# Patient Record
Sex: FEMALE | Race: WHITE | NOT HISPANIC OR LATINO | Employment: PART TIME | URBAN - METROPOLITAN AREA
[De-identification: names, ages, dates, MRNs, and addresses within clinical notes are randomized per-mention and may not be internally consistent; named-entity substitution may affect disease eponyms.]

---

## 2023-09-08 PROBLEM — R03.0 BLOOD PRESSURE ELEVATED WITHOUT HISTORY OF HTN: Status: ACTIVE | Noted: 2023-09-08

## 2023-09-08 PROBLEM — G43.909 MIGRAINES: Status: ACTIVE | Noted: 2023-09-08

## 2023-09-08 PROBLEM — E06.3 HASHIMOTO'S DISEASE: Status: ACTIVE | Noted: 2023-09-08

## 2023-09-08 PROBLEM — E55.9 VITAMIN D DEFICIENCY: Status: ACTIVE | Noted: 2023-09-08

## 2023-09-08 RX ORDER — ALBUTEROL SULFATE 90 UG/1
2 AEROSOL, METERED RESPIRATORY (INHALATION) EVERY 4 HOURS PRN
COMMUNITY
Start: 2021-03-31 | End: 2024-04-26 | Stop reason: SDUPTHER

## 2023-09-08 RX ORDER — MULTIVIT/FOLIC ACID/HERBAL 223 400 MCG
TABLET ORAL
COMMUNITY

## 2023-09-08 RX ORDER — MULTIVITAMIN
1 TABLET ORAL DAILY
COMMUNITY

## 2023-09-08 RX ORDER — LEVOTHYROXINE SODIUM 50 UG/1
0.5 TABLET ORAL DAILY
COMMUNITY
Start: 2020-06-26 | End: 2023-10-19 | Stop reason: SDUPTHER

## 2023-09-11 ENCOUNTER — OFFICE VISIT (OUTPATIENT)
Dept: PRIMARY CARE | Facility: CLINIC | Age: 48
End: 2023-09-11
Payer: COMMERCIAL

## 2023-09-11 VITALS
HEIGHT: 64 IN | BODY MASS INDEX: 25.95 KG/M2 | DIASTOLIC BLOOD PRESSURE: 82 MMHG | SYSTOLIC BLOOD PRESSURE: 124 MMHG | WEIGHT: 152 LBS

## 2023-09-11 DIAGNOSIS — Z00.00 HEALTH CARE MAINTENANCE: ICD-10-CM

## 2023-09-11 DIAGNOSIS — Z12.31 BREAST CANCER SCREENING BY MAMMOGRAM: ICD-10-CM

## 2023-09-11 DIAGNOSIS — Z00.00 HEALTHCARE MAINTENANCE: Primary | ICD-10-CM

## 2023-09-11 DIAGNOSIS — E06.3 HYPOTHYROIDISM DUE TO HASHIMOTO'S THYROIDITIS: ICD-10-CM

## 2023-09-11 DIAGNOSIS — E03.8 HYPOTHYROIDISM DUE TO HASHIMOTO'S THYROIDITIS: ICD-10-CM

## 2023-09-11 PROCEDURE — 1036F TOBACCO NON-USER: CPT | Performed by: STUDENT IN AN ORGANIZED HEALTH CARE EDUCATION/TRAINING PROGRAM

## 2023-09-11 PROCEDURE — 99203 OFFICE O/P NEW LOW 30 MIN: CPT | Performed by: STUDENT IN AN ORGANIZED HEALTH CARE EDUCATION/TRAINING PROGRAM

## 2023-09-11 RX ORDER — DROSPIRENONE 4 MG/1
1 TABLET, FILM COATED ORAL NIGHTLY
COMMUNITY
End: 2023-12-12 | Stop reason: SDUPTHER

## 2023-09-11 NOTE — PROGRESS NOTES
Subjective   Patient ID: Kaila Jo is a 48 y.o. female who presents for Establish Care.  SAVITA Amaya is a 49 y/o female who is here to establish care.    She eats a balanced diet. She walks the dog for ~30 minutes 3x/week. She does not have a voiced complaint at this time and is interested in updating her mammogram. She has not had any significant palpitations recently, gets them occasionally after she eats, associated with hot flashes/post-menopausal syndrome.    PMHx: Hypothyroidism  SurgHx: Lipoma removal, wrist fracture repair, tonsillectomy  FamHx: HTN - Father, Meniere's disease, COPD - Mother  SocialHx: Works as a . Never smoker, no drug use. 1-2x/week. Lives with , two daughters (16), (13).     Review of Systems  12-point ROS was reviewed and is negative, unless otherwise noted in HPI    Objective   Vitals:    09/11/23 1008   BP: 124/82      Physical Exam  GEN: alert, conversant, NAD  HEENT: PERRL, EOMI, MMM, tms pearly gray bilaterally   NECK: supple, no LAD appreciated  CHEST: CTAB  CV: S1, S2, RRR, no murmurs appreciated  ABD: soft, NT, ND  EXT: no significant LE edema  SKIN: warm, dry    Assessment/Plan   #Hypothyroidism d/t hashimotos   - Continue Levothyroxine  - Recent TSH level reviewed, TSH ordered - patient to get labwork in December    #intermittent palpitations  #post-menopausal syndrome  - improved with [place] Cohash  - reviewed labwork from January     Health Maintenance:  Vaccines: COVID - UTD, Flu (will get in October), TDAP (2016)  Screening: Colonoscopy (due in 2027-29), Mammogram (ordered), Pap test (11/22)  Labs: Ordered CBC, CMP, lipid panel, TSH       RTC in 12 months, or sooner PRN    Steven Hutton MD

## 2023-10-19 ENCOUNTER — LAB (OUTPATIENT)
Dept: LAB | Facility: LAB | Age: 48
End: 2023-10-19
Payer: COMMERCIAL

## 2023-10-19 DIAGNOSIS — Z00.00 HEALTHCARE MAINTENANCE: ICD-10-CM

## 2023-10-19 DIAGNOSIS — E03.8 HYPOTHYROIDISM DUE TO HASHIMOTO'S THYROIDITIS: ICD-10-CM

## 2023-10-19 DIAGNOSIS — E06.3 HYPOTHYROIDISM DUE TO HASHIMOTO'S THYROIDITIS: ICD-10-CM

## 2023-10-19 DIAGNOSIS — Z00.00 HEALTH CARE MAINTENANCE: ICD-10-CM

## 2023-10-19 DIAGNOSIS — E03.8 HYPOTHYROIDISM DUE TO HASHIMOTO'S THYROIDITIS: Primary | ICD-10-CM

## 2023-10-19 DIAGNOSIS — E06.3 HYPOTHYROIDISM DUE TO HASHIMOTO'S THYROIDITIS: Primary | ICD-10-CM

## 2023-10-19 LAB
ALBUMIN SERPL BCP-MCNC: 4.6 G/DL (ref 3.4–5)
ALP SERPL-CCNC: 77 U/L (ref 33–110)
ALT SERPL W P-5'-P-CCNC: 25 U/L (ref 7–45)
ANION GAP SERPL CALC-SCNC: 17 MMOL/L (ref 10–20)
AST SERPL W P-5'-P-CCNC: 18 U/L (ref 9–39)
BILIRUB SERPL-MCNC: 0.7 MG/DL (ref 0–1.2)
BUN SERPL-MCNC: 10 MG/DL (ref 6–23)
CALCIUM SERPL-MCNC: 9.3 MG/DL (ref 8.6–10.6)
CHLORIDE SERPL-SCNC: 105 MMOL/L (ref 98–107)
CHOLEST SERPL-MCNC: 203 MG/DL (ref 0–199)
CHOLESTEROL/HDL RATIO: 3.6
CO2 SERPL-SCNC: 22 MMOL/L (ref 21–32)
CREAT SERPL-MCNC: 0.77 MG/DL (ref 0.5–1.05)
ERYTHROCYTE [DISTWIDTH] IN BLOOD BY AUTOMATED COUNT: 12.4 % (ref 11.5–14.5)
GFR SERPL CREATININE-BSD FRML MDRD: >90 ML/MIN/1.73M*2
GLUCOSE SERPL-MCNC: 94 MG/DL (ref 74–99)
HCT VFR BLD AUTO: 40.9 % (ref 36–46)
HDLC SERPL-MCNC: 56.7 MG/DL
HGB BLD-MCNC: 13.2 G/DL (ref 12–16)
LDLC SERPL CALC-MCNC: 121 MG/DL
MCH RBC QN AUTO: 29.1 PG (ref 26–34)
MCHC RBC AUTO-ENTMCNC: 32.3 G/DL (ref 32–36)
MCV RBC AUTO: 90 FL (ref 80–100)
NON HDL CHOLESTEROL: 146 MG/DL (ref 0–149)
NRBC BLD-RTO: 0 /100 WBCS (ref 0–0)
PLATELET # BLD AUTO: 291 X10*3/UL (ref 150–450)
PMV BLD AUTO: 10.9 FL (ref 7.5–11.5)
POTASSIUM SERPL-SCNC: 4.1 MMOL/L (ref 3.5–5.3)
PROT SERPL-MCNC: 7.1 G/DL (ref 6.4–8.2)
RBC # BLD AUTO: 4.54 X10*6/UL (ref 4–5.2)
SODIUM SERPL-SCNC: 140 MMOL/L (ref 136–145)
TRIGL SERPL-MCNC: 125 MG/DL (ref 0–149)
TSH SERPL-ACNC: 3.36 MIU/L (ref 0.44–3.98)
VLDL: 25 MG/DL (ref 0–40)
WBC # BLD AUTO: 7.5 X10*3/UL (ref 4.4–11.3)

## 2023-10-19 PROCEDURE — 80053 COMPREHEN METABOLIC PANEL: CPT

## 2023-10-19 PROCEDURE — 36415 COLL VENOUS BLD VENIPUNCTURE: CPT

## 2023-10-19 PROCEDURE — 80061 LIPID PANEL: CPT

## 2023-10-19 PROCEDURE — 84443 ASSAY THYROID STIM HORMONE: CPT

## 2023-10-19 PROCEDURE — 85027 COMPLETE CBC AUTOMATED: CPT

## 2023-10-19 RX ORDER — LEVOTHYROXINE SODIUM 25 UG/1
25 TABLET ORAL DAILY
Qty: 90 TABLET | Refills: 1 | Status: SHIPPED | OUTPATIENT
Start: 2023-10-19 | End: 2024-04-17

## 2023-11-20 ENCOUNTER — APPOINTMENT (OUTPATIENT)
Dept: RADIOLOGY | Facility: CLINIC | Age: 48
End: 2023-11-20
Payer: COMMERCIAL

## 2023-12-08 ENCOUNTER — ANCILLARY PROCEDURE (OUTPATIENT)
Dept: RADIOLOGY | Facility: CLINIC | Age: 48
End: 2023-12-08
Payer: COMMERCIAL

## 2023-12-08 DIAGNOSIS — Z12.31 BREAST CANCER SCREENING BY MAMMOGRAM: ICD-10-CM

## 2023-12-08 PROCEDURE — 77067 SCR MAMMO BI INCL CAD: CPT | Mod: BILATERAL PROCEDURE | Performed by: RADIOLOGY

## 2023-12-08 PROCEDURE — 77063 BREAST TOMOSYNTHESIS BI: CPT | Mod: BILATERAL PROCEDURE | Performed by: RADIOLOGY

## 2023-12-08 PROCEDURE — 77067 SCR MAMMO BI INCL CAD: CPT

## 2023-12-12 ENCOUNTER — OFFICE VISIT (OUTPATIENT)
Dept: OBSTETRICS AND GYNECOLOGY | Facility: CLINIC | Age: 48
End: 2023-12-12
Payer: COMMERCIAL

## 2023-12-12 VITALS
BODY MASS INDEX: 25.27 KG/M2 | WEIGHT: 148 LBS | DIASTOLIC BLOOD PRESSURE: 74 MMHG | SYSTOLIC BLOOD PRESSURE: 126 MMHG | HEIGHT: 64 IN

## 2023-12-12 DIAGNOSIS — Z01.419 ENCOUNTER FOR ANNUAL ROUTINE GYNECOLOGICAL EXAMINATION: ICD-10-CM

## 2023-12-12 DIAGNOSIS — Z78.0 MENOPAUSE: Primary | ICD-10-CM

## 2023-12-12 PROCEDURE — 87624 HPV HI-RISK TYP POOLED RSLT: CPT

## 2023-12-12 PROCEDURE — 88175 CYTOPATH C/V AUTO FLUID REDO: CPT

## 2023-12-12 PROCEDURE — 99396 PREV VISIT EST AGE 40-64: CPT | Performed by: OBSTETRICS & GYNECOLOGY

## 2023-12-12 PROCEDURE — 1036F TOBACCO NON-USER: CPT | Performed by: OBSTETRICS & GYNECOLOGY

## 2023-12-12 RX ORDER — DROSPIRENONE 4 MG/1
1 TABLET, FILM COATED ORAL NIGHTLY
Qty: 90 TABLET | Refills: 3 | Status: SHIPPED | OUTPATIENT
Start: 2023-12-12

## 2023-12-12 ASSESSMENT — ENCOUNTER SYMPTOMS
ENDOCRINE NEGATIVE: 0
PSYCHIATRIC NEGATIVE: 0
MUSCULOSKELETAL NEGATIVE: 0
ALLERGIC/IMMUNOLOGIC NEGATIVE: 0
HEMATOLOGIC/LYMPHATIC NEGATIVE: 0
NEUROLOGICAL NEGATIVE: 0
EYES NEGATIVE: 0
RESPIRATORY NEGATIVE: 0
GASTROINTESTINAL NEGATIVE: 0
CARDIOVASCULAR NEGATIVE: 0
CONSTITUTIONAL NEGATIVE: 0

## 2023-12-12 ASSESSMENT — PAIN SCALES - GENERAL: PAINLEVEL: 0-NO PAIN

## 2023-12-12 NOTE — PROGRESS NOTES
Subjective   Patient ID: Kaila Jo is a 48 y.o. female who presents for Annual Exam.  HPI  She is 48 years old female came today to the office for annual exam and refill on Slynd which she used it for reduced symptoms of menopause such as hot flashes heart rate increased, night sweats.  She is using the slynd and her last menstrual period was in January 2023.  She is sexually active denies any discharge or abdominal pain. She had a pap smear last year and was positive for ascus.  Review of Systems  The rest of the system was tested and were negative.  Objective   Physical Exam  General-alert and oriented, not acute distress  Lungs-clear bilateral breath sounds  Cardiac-normal S1-S2 , normal rate injury ,no murmur was appreciated  Abdomen -soft ,non-tender ,non-distended  Breast exam-normal, no lesion, no nipple discharge or nipple retraction, no nodules were palpated female axillary lymph nodule  Skin-no lesion or rash  GYN-atrophic rugae, no discoloration of the skin in the genital area, negative for lesion, negative for abnormal discharge #  Assessment/Plan   Problem List Items Addressed This Visit    None  Visit Diagnoses       Encounter for annual routine gynecological examination            She is 48 years old female came today to the office for annual exam    # Annual exam  -Normal breast exam and GYN exam  -Pap smear was done today because her last pap smear  was 13 months ago and was positive for ASCUS with negative HPV 16 and 18.  -She did do mammogram last Friday but the results are not ready  yet.  -did colonoscopy 2 years ago and she is good for 5-7 years    #menopause symptoms  -currently on Slynd 4 mg  -the symptoms are better with this medication  -her last periods were in January 2023.    I discussed my plan with my attending, Dr Campos and she agreed to that.                Lucía Chaparro MD 12/12/23 11:51 AM

## 2024-01-04 LAB
CYTOLOGY CMNT CVX/VAG CYTO-IMP: NORMAL
HPV HR 12 DNA GENITAL QL NAA+PROBE: NEGATIVE
HPV HR GENOTYPES PNL CVX NAA+PROBE: NEGATIVE
HPV16 DNA SPEC QL NAA+PROBE: NEGATIVE
HPV18 DNA SPEC QL NAA+PROBE: NEGATIVE
LAB AP HPV GENOTYPE QUESTION: YES
LAB AP HPV HR: NORMAL
LABORATORY COMMENT REPORT: NORMAL
PATH REPORT.TOTAL CANCER: NORMAL

## 2024-03-18 ENCOUNTER — HOSPITAL ENCOUNTER (OUTPATIENT)
Dept: CARDIOLOGY | Facility: HOSPITAL | Age: 49
Discharge: HOME | End: 2024-03-18
Payer: COMMERCIAL

## 2024-03-18 ENCOUNTER — HOSPITAL ENCOUNTER (EMERGENCY)
Facility: HOSPITAL | Age: 49
Discharge: HOME | End: 2024-03-18
Attending: STUDENT IN AN ORGANIZED HEALTH CARE EDUCATION/TRAINING PROGRAM
Payer: COMMERCIAL

## 2024-03-18 ENCOUNTER — APPOINTMENT (OUTPATIENT)
Dept: RADIOLOGY | Facility: HOSPITAL | Age: 49
End: 2024-03-18
Payer: COMMERCIAL

## 2024-03-18 VITALS
BODY MASS INDEX: 25.4 KG/M2 | SYSTOLIC BLOOD PRESSURE: 126 MMHG | RESPIRATION RATE: 20 BRPM | WEIGHT: 148 LBS | HEART RATE: 67 BPM | OXYGEN SATURATION: 99 % | TEMPERATURE: 97.9 F | DIASTOLIC BLOOD PRESSURE: 66 MMHG

## 2024-03-18 DIAGNOSIS — R07.9 CHEST PAIN, UNSPECIFIED TYPE: ICD-10-CM

## 2024-03-18 DIAGNOSIS — R03.0 PREHYPERTENSION: Primary | ICD-10-CM

## 2024-03-18 DIAGNOSIS — R07.9 CHEST PAIN, UNSPECIFIED: ICD-10-CM

## 2024-03-18 LAB
ALBUMIN SERPL BCP-MCNC: 4.7 G/DL (ref 3.4–5)
ALP SERPL-CCNC: 72 U/L (ref 33–110)
ALT SERPL W P-5'-P-CCNC: 20 U/L (ref 7–45)
ANION GAP SERPL CALC-SCNC: 13 MMOL/L (ref 10–20)
AST SERPL W P-5'-P-CCNC: 18 U/L (ref 9–39)
BASOPHILS # BLD AUTO: 0.01 X10*3/UL (ref 0–0.1)
BASOPHILS NFR BLD AUTO: 0.2 %
BILIRUB SERPL-MCNC: 0.4 MG/DL (ref 0–1.2)
BNP SERPL-MCNC: 41 PG/ML (ref 0–99)
BUN SERPL-MCNC: 9 MG/DL (ref 6–23)
CALCIUM SERPL-MCNC: 9.5 MG/DL (ref 8.6–10.3)
CARDIAC TROPONIN I PNL SERPL HS: <3 NG/L (ref 0–13)
CARDIAC TROPONIN I PNL SERPL HS: <3 NG/L (ref 0–13)
CHLORIDE SERPL-SCNC: 106 MMOL/L (ref 98–107)
CO2 SERPL-SCNC: 26 MMOL/L (ref 21–32)
CREAT SERPL-MCNC: 0.71 MG/DL (ref 0.5–1.05)
EGFRCR SERPLBLD CKD-EPI 2021: >90 ML/MIN/1.73M*2
EOSINOPHIL # BLD AUTO: 0.02 X10*3/UL (ref 0–0.7)
EOSINOPHIL NFR BLD AUTO: 0.4 %
ERYTHROCYTE [DISTWIDTH] IN BLOOD BY AUTOMATED COUNT: 12.1 % (ref 11.5–14.5)
GLUCOSE SERPL-MCNC: 104 MG/DL (ref 74–99)
HCT VFR BLD AUTO: 42.2 % (ref 36–46)
HGB BLD-MCNC: 14.4 G/DL (ref 12–16)
IMM GRANULOCYTES # BLD AUTO: 0.02 X10*3/UL (ref 0–0.7)
IMM GRANULOCYTES NFR BLD AUTO: 0.4 % (ref 0–0.9)
LYMPHOCYTES # BLD AUTO: 0.78 X10*3/UL (ref 1.2–4.8)
LYMPHOCYTES NFR BLD AUTO: 14.4 %
MCH RBC QN AUTO: 30.4 PG (ref 26–34)
MCHC RBC AUTO-ENTMCNC: 34.1 G/DL (ref 32–36)
MCV RBC AUTO: 89 FL (ref 80–100)
MONOCYTES # BLD AUTO: 0.28 X10*3/UL (ref 0.1–1)
MONOCYTES NFR BLD AUTO: 5.2 %
NEUTROPHILS # BLD AUTO: 4.31 X10*3/UL (ref 1.2–7.7)
NEUTROPHILS NFR BLD AUTO: 79.4 %
NRBC BLD-RTO: 0 /100 WBCS (ref 0–0)
PLATELET # BLD AUTO: 244 X10*3/UL (ref 150–450)
POTASSIUM SERPL-SCNC: 3.9 MMOL/L (ref 3.5–5.3)
PROT SERPL-MCNC: 7.6 G/DL (ref 6.4–8.2)
RBC # BLD AUTO: 4.73 X10*6/UL (ref 4–5.2)
SODIUM SERPL-SCNC: 141 MMOL/L (ref 136–145)
WBC # BLD AUTO: 5.4 X10*3/UL (ref 4.4–11.3)

## 2024-03-18 PROCEDURE — 99284 EMERGENCY DEPT VISIT MOD MDM: CPT | Mod: 25

## 2024-03-18 PROCEDURE — 84484 ASSAY OF TROPONIN QUANT: CPT

## 2024-03-18 PROCEDURE — 80053 COMPREHEN METABOLIC PANEL: CPT

## 2024-03-18 PROCEDURE — 83880 ASSAY OF NATRIURETIC PEPTIDE: CPT

## 2024-03-18 PROCEDURE — 84484 ASSAY OF TROPONIN QUANT: CPT | Performed by: STUDENT IN AN ORGANIZED HEALTH CARE EDUCATION/TRAINING PROGRAM

## 2024-03-18 PROCEDURE — 71046 X-RAY EXAM CHEST 2 VIEWS: CPT | Performed by: RADIOLOGY

## 2024-03-18 PROCEDURE — 36415 COLL VENOUS BLD VENIPUNCTURE: CPT | Performed by: STUDENT IN AN ORGANIZED HEALTH CARE EDUCATION/TRAINING PROGRAM

## 2024-03-18 PROCEDURE — 96374 THER/PROPH/DIAG INJ IV PUSH: CPT

## 2024-03-18 PROCEDURE — 85025 COMPLETE CBC W/AUTO DIFF WBC: CPT

## 2024-03-18 PROCEDURE — 2500000004 HC RX 250 GENERAL PHARMACY W/ HCPCS (ALT 636 FOR OP/ED)

## 2024-03-18 PROCEDURE — 93005 ELECTROCARDIOGRAM TRACING: CPT

## 2024-03-18 PROCEDURE — 71046 X-RAY EXAM CHEST 2 VIEWS: CPT

## 2024-03-18 RX ORDER — KETOROLAC TROMETHAMINE 30 MG/ML
15 INJECTION, SOLUTION INTRAMUSCULAR; INTRAVENOUS ONCE
Status: COMPLETED | OUTPATIENT
Start: 2024-03-18 | End: 2024-03-18

## 2024-03-18 RX ADMIN — KETOROLAC TROMETHAMINE 15 MG: 30 INJECTION, SOLUTION INTRAMUSCULAR at 10:13

## 2024-03-18 ASSESSMENT — COLUMBIA-SUICIDE SEVERITY RATING SCALE - C-SSRS
1. IN THE PAST MONTH, HAVE YOU WISHED YOU WERE DEAD OR WISHED YOU COULD GO TO SLEEP AND NOT WAKE UP?: NO
6. HAVE YOU EVER DONE ANYTHING, STARTED TO DO ANYTHING, OR PREPARED TO DO ANYTHING TO END YOUR LIFE?: NO
2. HAVE YOU ACTUALLY HAD ANY THOUGHTS OF KILLING YOURSELF?: NO

## 2024-03-18 ASSESSMENT — HEART SCORE
ECG: NON-SPECIFIC REPOLARIZATION DISTURBANCE
HEART SCORE: 2
HISTORY: SLIGHTLY SUSPICIOUS
TROPONIN: LESS THAN OR EQUAL TO NORMAL LIMIT
AGE: 45-64
RISK FACTORS: NO KNOWN RISK FACTORS

## 2024-03-18 ASSESSMENT — PAIN DESCRIPTION - LOCATION: LOCATION: CHEST

## 2024-03-18 ASSESSMENT — PAIN SCALES - GENERAL
PAINLEVEL_OUTOF10: 0 - NO PAIN
PAINLEVEL_OUTOF10: 1
PAINLEVEL_OUTOF10: 2

## 2024-03-18 ASSESSMENT — PAIN - FUNCTIONAL ASSESSMENT: PAIN_FUNCTIONAL_ASSESSMENT: 0-10

## 2024-03-18 ASSESSMENT — PAIN DESCRIPTION - ORIENTATION: ORIENTATION: RIGHT

## 2024-03-18 NOTE — DISCHARGE INSTRUCTIONS
You are found to have elevated blood pressure here in the emergency department I recommend that you follow-up with your primary care doctor soon as possible to have your blood pressure reevaluated and see if you require either a change in medication or be started on medication.    You have been evaluated in the Emergency Department for chest pain. There are many different causes of chest pain. Some of these causes could be serious, such as a heart attack or blood clot in the lungs, but many other causes are not life threatening, such as heartburn, viral infections, bruised bones/muscles, etc. On evaluation we did not find any emergent causes for your chest pain at this time.     Please return to Emergency Department or seek medical attention immediately if you have acute worsening in your chest pain or develop shortness of breath, repeated vomiting, fever, altered level of consciousness, coughing up blood, or start sweating and feel clammy.    If you were prescribed any medicine for home, please take as prescribed by your health-care provider. If you were given any follow-up appointments or numbers to call, please do so as instructed. Avoid any activities that bring on the chest pain. Also, avoid any tobacco products or excessive alcohol. Please ensure understanding of these instructions prior to discharge.    Please call your primary care physician and follow-up with them in the next 1 to 2 days.

## 2024-03-18 NOTE — ED PROVIDER NOTES
History of Present Illness   CC: Chest Pain (Complaints of right upper chest pain with radiation down right arm, patient states she recently had URI and is just starting to feel better.)     History provided by: Patient  Limitations to History: None    HPI:  Kaila Jo is a 49 y.o. female history of hypothyroidism presenting to the emergency department with concern for chest pain.  Patient reports she woke up this morning and was laying in bed when she developed right upper chest pain that radiated into her right arm, also reports affiliated sensation of warmth and felt sweaty.  States she took 200 mg of ibuprofen and 81 mg of aspirin and has some improvement in symptoms.  Has never had chest pain like this before.  States the pain radiates into her shoulder as well but is improved compared to what she was experiencing this morning.  Denies affiliated headache, head vision changes, fever, neck pain.  No shortness of breath.  Reports she had a recent upper respiratory infection but no active productive cough.  No chills, congestion or rhinorrhea.  Denies nausea, vomiting or abdominal pain.  No urinary symptoms, no blood in the urine or blood in the stool.    External Records Reviewed: Outpatient records    Physical Exam   - CONSTITUTION: Well-appearing female, resting comfortably in bed, in no acute distress  - NEUROLOGIC: Awake, alert and follows commands. EOMI, PERRL.  No other focal deficits appreciated.   - CARDIOVASCULAR: RRR, S1S2, no murmurs, no chest wall tenderness  - RESPIRATORY: Clear breath sounds bilaterally  - GI: Abdomen soft, nontender, nondistended.  - EXTREMITIES: Warm and dry.  NV exam intact x 4.  Reproducible tenderness over right shoulder with palpation  - SKIN: warm and dry  - PSYCHIATRIC: Calm, appropriate    ED Course & Medical Decision Making   ED Course:  ED Course as of 03/18/24 1201   Mon Mar 18, 2024   0946 EKG performed at 8:44 AM, interpreted by me.  I know sinus rhythm with rate  of 68.  T wave inversion noted in V2 and lead II, no ST elevation or depression.  Normal axis and normal intervals.  When compared to EKG from April 2021, these T wave inversions are new [PW]      ED Course User Index  [PW] Lucia Alfaro DO         Diagnoses as of 03/18/24 1201   Prehypertension   Chest pain, unspecified type       Social Determinants Limiting Care: None identified    MDM:  49 y.o. female Presenting to the emergency department with chest pain.  On physical exam, patient is very well-appearing, hemodynamically stable, mildly elevated blood pressure but otherwise in no acute distress.  Physical exam does not demonstrate any signs of volume overload so low concern for CHF, EKG without signs of active ischemia.  Troponin and delta troponin within normal limits with a reassuring EKG so low concern for NSTEMI.  Presentation today is not consistent with acute PE as she is low risk Wells and is PERC negative.  No evidence of acute cardiopulmonary pathology on her chest x-ray including pneumothorax, evidence of pneumonia.  Troponin within normal limits so low concern for pericarditis versus myocarditis.  Her heart score is 1.  After reassuring workup here in the emergency department, and through shared decision making, feel it is appropriate for patient to be discharged home with outpatient follow-up.  Patient is agreeable with this plan.  Patient discharged in stable condition    Disposition   Discharge    Procedures   Procedures    Patient seen and discussed with ED attending physician.    Lucia Alfaro DO  Emergency Medicine PGY-2        Lucia Alfaro DO  Resident  03/18/24 1201

## 2024-03-22 PROCEDURE — 93005 ELECTROCARDIOGRAM TRACING: CPT

## 2024-03-26 LAB
ATRIAL RATE: 68 BPM
P AXIS: 22 DEGREES
P OFFSET: 195 MS
P ONSET: 143 MS
PR INTERVAL: 140 MS
Q ONSET: 213 MS
QRS COUNT: 11 BEATS
QRS DURATION: 74 MS
QT INTERVAL: 386 MS
QTC CALCULATION(BAZETT): 410 MS
QTC FREDERICIA: 402 MS
R AXIS: -5 DEGREES
T AXIS: -7 DEGREES
T OFFSET: 406 MS
VENTRICULAR RATE: 68 BPM

## 2024-04-10 ENCOUNTER — OFFICE VISIT (OUTPATIENT)
Dept: CARDIOLOGY | Facility: CLINIC | Age: 49
End: 2024-04-10
Payer: COMMERCIAL

## 2024-04-10 VITALS
HEART RATE: 78 BPM | WEIGHT: 152 LBS | OXYGEN SATURATION: 97 % | DIASTOLIC BLOOD PRESSURE: 80 MMHG | HEIGHT: 64 IN | BODY MASS INDEX: 25.95 KG/M2 | SYSTOLIC BLOOD PRESSURE: 132 MMHG

## 2024-04-10 DIAGNOSIS — R07.9 CHEST PAIN, UNSPECIFIED TYPE: ICD-10-CM

## 2024-04-10 DIAGNOSIS — E78.5 HYPERLIPIDEMIA, UNSPECIFIED HYPERLIPIDEMIA TYPE: Primary | ICD-10-CM

## 2024-04-10 PROBLEM — N95.1 HOT FLASHES DUE TO MENOPAUSE: Status: ACTIVE | Noted: 2024-04-10

## 2024-04-10 PROBLEM — R00.2 PALPITATIONS: Status: ACTIVE | Noted: 2024-04-10

## 2024-04-10 PROBLEM — R06.09 DYSPNEA ON EXERTION: Status: ACTIVE | Noted: 2024-04-10

## 2024-04-10 PROBLEM — R61 NIGHT SWEATS: Status: ACTIVE | Noted: 2024-04-10

## 2024-04-10 PROBLEM — N92.6 IRREGULAR MENSES: Status: ACTIVE | Noted: 2024-04-10

## 2024-04-10 PROBLEM — U07.1 DISEASE DUE TO SEVERE ACUTE RESPIRATORY SYNDROME CORONAVIRUS 2 (SARS-COV-2): Status: ACTIVE | Noted: 2024-04-10

## 2024-04-10 PROCEDURE — 99204 OFFICE O/P NEW MOD 45 MIN: CPT | Performed by: STUDENT IN AN ORGANIZED HEALTH CARE EDUCATION/TRAINING PROGRAM

## 2024-04-10 NOTE — PROGRESS NOTES
Referred by Dr. Falcon ref. provider found for Follow-up     History Of Present Illness:    Kaila Jo is a 49 y.o. female past med history notable for hypothyroidism, hyperlipidemia who is here for atypical right-sided chest discomfort.  Patient noted couple weeks ago she had episode of right-sided chest discomfort with radiation down her right arm day after doing yard work.  There is no associated shortness of breath no left-sided discomfort.  No nausea vomiting.  No association with food or position.  1 week prior to this event she did have an upper respiratory infection.  Symptoms have since resolved.  She did present to the ER for evaluation and workup was overall negative.  Baseline ECG is normal sinus rhythm normal axis with isolated T wave inversion in lead III.  Interestingly, she does have episodes of fast heartbeats usually 10 to 15 minutes after eating which was deemed associated to menopause. Patient is had previous workup in the past including echocardiogram in 2022 which showed normal ejection fraction without significant valvular pathology.  Exercise stress echocardiogram in 2021 which showed appropriate functional capacity and no evidence of ischemia by EKG or echocardiogram.  She is doing fine today.  Her baseline LDL is 129.  Admits to dietary indiscretion.  Denies tobacco consumption.  Social drinker.  Denies heavy caffeine intake.      Past Medical History:  She has a past medical history of COVID-19, Personal history of other diseases of the respiratory system, Personal history of other endocrine, nutritional and metabolic disease, and Personal history of other infectious and parasitic diseases.    Past Surgical History:  She has a past surgical history that includes Other surgical history (11/29/2022); Other surgical history (11/29/2022); Other surgical history (11/29/2022); Other surgical history (11/29/2022); and Other surgical history (11/29/2022).      Social History:  She reports that  "she has never smoked. She has never used smokeless tobacco. She reports current alcohol use. She reports that she does not currently use drugs.    Family History:  No family history on file.     Allergies:  Patient has no known allergies.    Outpatient Medications:  Current Outpatient Medications   Medication Instructions    albuterol 90 mcg/actuation inhaler 2 puffs, inhalation, Every 4 hours PRN    CALCIUM CARBONATE-VITAMIN D3 ORAL 1 tablet, oral, Daily    levothyroxine (SYNTHROID, LEVOXYL) 25 mcg, oral, Daily    multivitamin tablet 1 tablet, oral, Daily    mv,Ca,min-FA-herbal comp #223 ([facility]) 400 mcg tablet oral    Slynd 4 mg (28) tablet 1 tablet, oral, Nightly        Last Recorded Vitals:  Vitals:    04/10/24 1138   BP: 132/80   BP Location: Left arm   Patient Position: Sitting   BP Cuff Size: Adult   Pulse: 78   SpO2: 97%   Weight: 68.9 kg (152 lb)   Height: 1.626 m (5' 4\")       Physical Exam:  General: No acute distress,  A&O x3  Skin: Warm and dry  Neck: JVD is not elevated  ENT: Moist mucous membranes no lesions appreciated  Pulmonary: CTAB  Cards: Regular rate rhythm, no murmurs gallops or rubs appreciated normal S1-S2  Abdomen: Soft nontender nondistended  Extremities: No edema or cyanosis  Psych: Appropriate mood and affect          Last Labs:  CBC -  Lab Results   Component Value Date    WBC 5.4 03/18/2024    HGB 14.4 03/18/2024    HCT 42.2 03/18/2024    MCV 89 03/18/2024     03/18/2024       CMP -  Lab Results   Component Value Date    CALCIUM 9.5 03/18/2024    PROT 7.6 03/18/2024    ALBUMIN 4.7 03/18/2024    AST 18 03/18/2024    ALT 20 03/18/2024    ALKPHOS 72 03/18/2024    BILITOT 0.4 03/18/2024       LIPID PANEL -   Lab Results   Component Value Date    CHOL 203 (H) 10/19/2023    TRIG 125 10/19/2023    HDL 56.7 10/19/2023    CHHDL 3.6 10/19/2023    LDLF 128 (H) 08/16/2022    VLDL 25 10/19/2023    NHDL 146 10/19/2023       RENAL FUNCTION PANEL -   Lab Results   Component " Value Date    GLUCOSE 104 (H) 03/18/2024     03/18/2024    K 3.9 03/18/2024     03/18/2024    CO2 26 03/18/2024    ANIONGAP 13 03/18/2024    BUN 9 03/18/2024    CREATININE 0.71 03/18/2024    CALCIUM 9.5 03/18/2024    ALBUMIN 4.7 03/18/2024        Lab Results   Component Value Date    BNP 41 03/18/2024       Last Cardiology Tests:  ECG:  ECG 12 lead (Ancillary Performed) 03/22/2024    Assessment/Plan     1.  Chest pain: Atypical likely musculoskeletal in origin.  Previous workup in ED fairly unremarkable.  Baseline ECG sinus rhythm normal axis and isolated T wave inversion in lead III.  Prior workup revealed normal ejection fraction by echocardiogram and appropriate functional capacity with normal stress test findings.  For now, suspect noncardiac origin of discomfort.  Conservative management as needed.    2.  Hyperlipidemia: .  Advise lifestyle modification including diet and exercise.  Calcium score ordered for further risk stratification.    (This note was generated with voice recognition software and may contain errors including spelling, grammar, syntax and missed recognition of what was dictated, of which may not have been fully corrected)     Russell Quintanilla MD PhD

## 2024-04-11 ENCOUNTER — APPOINTMENT (OUTPATIENT)
Dept: CARDIOLOGY | Facility: CLINIC | Age: 49
End: 2024-04-11
Payer: COMMERCIAL

## 2024-04-17 DIAGNOSIS — E06.3 HYPOTHYROIDISM DUE TO HASHIMOTO'S THYROIDITIS: ICD-10-CM

## 2024-04-17 DIAGNOSIS — E03.8 HYPOTHYROIDISM DUE TO HASHIMOTO'S THYROIDITIS: ICD-10-CM

## 2024-04-17 RX ORDER — LEVOTHYROXINE SODIUM 25 UG/1
25 TABLET ORAL DAILY
Qty: 90 TABLET | Refills: 1 | Status: SHIPPED | OUTPATIENT
Start: 2024-04-17

## 2024-04-26 DIAGNOSIS — R06.09 DYSPNEA ON EXERTION: Primary | ICD-10-CM

## 2024-04-26 RX ORDER — ALBUTEROL SULFATE 90 UG/1
2 AEROSOL, METERED RESPIRATORY (INHALATION) EVERY 4 HOURS PRN
Qty: 18 G | Refills: 0 | Status: SHIPPED | OUTPATIENT
Start: 2024-04-26

## 2024-05-13 ENCOUNTER — OFFICE VISIT (OUTPATIENT)
Dept: PRIMARY CARE | Facility: CLINIC | Age: 49
End: 2024-05-13
Payer: COMMERCIAL

## 2024-05-13 VITALS — BODY MASS INDEX: 25.92 KG/M2 | WEIGHT: 151 LBS | SYSTOLIC BLOOD PRESSURE: 110 MMHG | DIASTOLIC BLOOD PRESSURE: 90 MMHG

## 2024-05-13 DIAGNOSIS — R07.89 RIGHT-SIDED CHEST WALL PAIN: Primary | ICD-10-CM

## 2024-05-13 PROCEDURE — 1036F TOBACCO NON-USER: CPT | Performed by: STUDENT IN AN ORGANIZED HEALTH CARE EDUCATION/TRAINING PROGRAM

## 2024-05-13 PROCEDURE — 99213 OFFICE O/P EST LOW 20 MIN: CPT | Performed by: STUDENT IN AN ORGANIZED HEALTH CARE EDUCATION/TRAINING PROGRAM

## 2024-05-13 ASSESSMENT — PATIENT HEALTH QUESTIONNAIRE - PHQ9
1. LITTLE INTEREST OR PLEASURE IN DOING THINGS: NOT AT ALL
SUM OF ALL RESPONSES TO PHQ9 QUESTIONS 1 AND 2: 0
2. FEELING DOWN, DEPRESSED OR HOPELESS: NOT AT ALL

## 2024-05-13 NOTE — PROGRESS NOTES
Subjective   Patient ID: Kaila Jo is a 49 y.o. female who presents for right breast pain (2-3 months).  HPI  Monique is here for sick visit.    She reports breast discomfort on 3-4 different occasions. She noted some correlation to when she has been taking her Slynd. She has been wearing a sleep bra which has been helpful. One episode of cold sweat with the breast discomfort. No skin changes, rashes. No breast mass or nipple discharge. No midsternal CP or pressure, no shortness of breath. She was evaluated in the ED and then by Cardiology for the discomfort.     Review of Systems  12-point ROS was reviewed and is negative, unless otherwise noted in HPI    Objective   Vitals:    05/13/24 1549   BP: 110/90      Physical Exam  GEN: alert, conversant, NAD  HEENT: PERRL, EOMI, MMM, tms pearly gray bilaterally   NECK: supple, no LAD appreciated  CHEST: CTAB  MSK: No reproducible ttp  CV: S1, S2, RRR, no murmurs appreciated  ABD: soft, NT, ND  EXT: no significant LE edema  SKIN: warm, dry    Assessment/Plan   #right sided chest wall pain  - continue sleep bra PRN  - use ice/heat PRN, acetaminophen/ibuprofen PRN  - follow up with [facility] as ordered by Cardiolgoy     #Hypothyroidism d/t hashimotos   - Continue Levothyroxine  - Recent TSH level reviewed, TSH ordered - patient to get labwork in December    #intermittent palpitations  #post-menopausal syndrome  - improved with [place]  - reviewed labwork from January     Health Maintenance:  Vaccines: COVID - UTD, Flu (UTD), TDAP (2016)  Screening: Colonoscopy (due in 2027-29), Mammogram (12/2023), Pap test (11/22)  Labs: None needed today    RTC in ~4 months, or sooner PRN    Steven Hutton,

## 2024-05-22 ENCOUNTER — HOSPITAL ENCOUNTER (OUTPATIENT)
Dept: RADIOLOGY | Facility: CLINIC | Age: 49
Discharge: HOME | End: 2024-05-22
Payer: COMMERCIAL

## 2024-05-22 DIAGNOSIS — E78.5 HYPERLIPIDEMIA, UNSPECIFIED HYPERLIPIDEMIA TYPE: ICD-10-CM

## 2024-05-22 PROCEDURE — 75571 CT HRT W/O DYE W/CA TEST: CPT

## 2024-05-23 ENCOUNTER — TELEPHONE (OUTPATIENT)
Dept: CARDIOLOGY | Facility: CLINIC | Age: 49
End: 2024-05-23
Payer: COMMERCIAL

## 2024-05-23 NOTE — TELEPHONE ENCOUNTER
----- Message from Russell Quintanilla MD PhD sent at 5/22/2024  7:50 PM EDT -----  Please notify patient of 0 calcium score.

## 2024-06-26 ENCOUNTER — APPOINTMENT (OUTPATIENT)
Dept: OBSTETRICS AND GYNECOLOGY | Facility: HOSPITAL | Age: 49
End: 2024-06-26
Payer: COMMERCIAL

## 2024-10-06 DIAGNOSIS — E06.3 HYPOTHYROIDISM DUE TO HASHIMOTO'S THYROIDITIS: ICD-10-CM

## 2024-10-07 RX ORDER — LEVOTHYROXINE SODIUM 25 UG/1
25 TABLET ORAL DAILY
Qty: 90 TABLET | Refills: 0 | Status: SHIPPED | OUTPATIENT
Start: 2024-10-07

## 2024-12-27 ENCOUNTER — OFFICE VISIT (OUTPATIENT)
Dept: URGENT CARE | Age: 49
End: 2024-12-27
Payer: COMMERCIAL

## 2024-12-27 VITALS
RESPIRATION RATE: 16 BRPM | TEMPERATURE: 98.3 F | DIASTOLIC BLOOD PRESSURE: 85 MMHG | SYSTOLIC BLOOD PRESSURE: 138 MMHG | HEART RATE: 78 BPM | OXYGEN SATURATION: 97 %

## 2024-12-27 DIAGNOSIS — J01.00 ACUTE NON-RECURRENT MAXILLARY SINUSITIS: Primary | ICD-10-CM

## 2024-12-27 RX ORDER — AMOXICILLIN AND CLAVULANATE POTASSIUM 875; 125 MG/1; MG/1
1 TABLET, FILM COATED ORAL 2 TIMES DAILY
Qty: 20 TABLET | Refills: 0 | Status: SHIPPED | OUTPATIENT
Start: 2024-12-27 | End: 2025-01-06

## 2024-12-27 RX ORDER — FLUCONAZOLE 150 MG/1
150 TABLET ORAL SEE ADMIN INSTRUCTIONS
Qty: 2 TABLET | Refills: 0 | Status: SHIPPED | OUTPATIENT
Start: 2024-12-27 | End: 2024-12-28

## 2024-12-27 ASSESSMENT — ENCOUNTER SYMPTOMS
FEVER: 0
COUGH: 1
SINUS PAIN: 1
FATIGUE: 0
HEADACHES: 1
SINUS PRESSURE: 1
CARDIOVASCULAR NEGATIVE: 1

## 2024-12-27 NOTE — PROGRESS NOTES
Subjective   Patient ID: Kaila Jo is a 49 y.o. female. They present today with a chief complaint of Headache, Sinusitis, Cough, and Nasal Congestion (X 2 weeks. TD-MA).    History of Present Illness    Headache  Associated symptoms: congestion, cough, drainage and sinus pressure    Associated symptoms: no ear pain, no fatigue and no fever    Sinusitis  Associated symptoms: congestion, cough and headaches    Associated symptoms: no ear pain, no fatigue and no fever    Cough  Associated symptoms include headaches and postnasal drip. Pertinent negatives include no ear pain or fever.       Patient presents to the urgent care for a chief complaint of concern of sinusitis as patient complains of sinus pressure congestion and thick yellowish nasal discharge, patient states that her symptoms began with a cold, though symptoms have since resolved did have period of feeling well and now is having sinus pressure and congestion.  Patient does endorse postnasal drip states that cough is due to throat irritation denies any ear pressure or pain no teeth sensitivity, has been taken Tylenol sinus to no resolve prompting visit to the urgent care    Past Medical History  Allergies as of 12/27/2024    (No Known Allergies)       (Not in a hospital admission)       Past Medical History:   Diagnosis Date    COVID-19     COVID-19    Personal history of other diseases of the respiratory system     History of asthma    Personal history of other endocrine, nutritional and metabolic disease     History of thyroid disorder    Personal history of other infectious and parasitic diseases     History of chickenpox       Past Surgical History:   Procedure Laterality Date    OTHER SURGICAL HISTORY  11/29/2022    Tonsillectomy    OTHER SURGICAL HISTORY  11/29/2022    Breast surgery    OTHER SURGICAL HISTORY  11/29/2022    Varicose vein ligation    OTHER SURGICAL HISTORY  11/29/2022    Wrist surgery    OTHER SURGICAL HISTORY  11/29/2022     Lipectomy        reports that she has never smoked. She has never used smokeless tobacco. She reports current alcohol use. She reports that she does not currently use drugs.    Review of Systems  Review of Systems   Constitutional:  Negative for fatigue and fever.   HENT:  Positive for congestion, ear discharge, postnasal drip, sinus pressure and sinus pain. Negative for ear pain.    Respiratory:  Positive for cough.    Cardiovascular: Negative.    Neurological:  Positive for headaches.                                  Objective    Vitals:    12/27/24 1620   BP: 138/85   Pulse: 78   Resp: 16   Temp: 36.8 °C (98.3 °F)   SpO2: 97%     No LMP recorded. Patient is perimenopausal.    Physical Exam  Vitals and nursing note reviewed.   Constitutional:       General: She is not in acute distress.     Appearance: Normal appearance. She is not ill-appearing, toxic-appearing or diaphoretic.   HENT:      Head: Normocephalic and atraumatic.      Right Ear: Tympanic membrane normal. There is no impacted cerumen.      Left Ear: Tympanic membrane normal. There is no impacted cerumen.      Nose: Congestion present.      Mouth/Throat:      Mouth: Mucous membranes are moist.      Pharynx: Posterior oropharyngeal erythema present. No oropharyngeal exudate.   Neck:      Vascular: No carotid bruit.   Cardiovascular:      Rate and Rhythm: Normal rate and regular rhythm.      Pulses: Normal pulses.      Heart sounds: Normal heart sounds.   Pulmonary:      Effort: Pulmonary effort is normal.      Breath sounds: Normal breath sounds.   Musculoskeletal:      Cervical back: Normal range of motion and neck supple. No rigidity or tenderness.   Lymphadenopathy:      Cervical: No cervical adenopathy.   Neurological:      General: No focal deficit present.      Mental Status: She is alert and oriented to person, place, and time.   Psychiatric:         Mood and Affect: Mood normal.         Behavior: Behavior normal.         Procedures    Point of  Care Test & Imaging Results from this visit  No results found for this visit on 12/27/24.   No results found.    Diagnostic study results (if any) were reviewed by Parvez Mclean PA-C.    Assessment/Plan   Allergies, medications, history, and pertinent labs/EKGs/Imaging reviewed by Parvez Mclean PA-C.     Medical Decision Making  Due to patient duration of symptoms, history of present illness and symptomology I do believe patient may have acute sinusitis patient replaced on Augmentin did discuss plenty of rest and fluids.  Patient verbalized understanding is agreeable to plan discharge emergent care A+O x 4 stable condition no signs of distress    Orders and Diagnoses  Diagnoses and all orders for this visit:  Acute non-recurrent maxillary sinusitis  -     fluconazole (Diflucan) 150 mg tablet; Take 1 tablet (150 mg) by mouth see administration instructions for 1 day. Take one tab now. Repeat in 7 days if symptoms persist.      Medical Admin Record      Patient disposition: Home    Electronically signed by Parvez Mclean PA-C  4:34 PM

## 2024-12-28 DIAGNOSIS — E06.3 HYPOTHYROIDISM DUE TO HASHIMOTO'S THYROIDITIS: ICD-10-CM

## 2024-12-30 RX ORDER — LEVOTHYROXINE SODIUM 25 UG/1
25 TABLET ORAL DAILY
Qty: 30 TABLET | Refills: 0 | OUTPATIENT
Start: 2024-12-30

## 2025-01-13 ENCOUNTER — APPOINTMENT (OUTPATIENT)
Dept: OBSTETRICS AND GYNECOLOGY | Facility: CLINIC | Age: 50
End: 2025-01-13
Payer: COMMERCIAL

## 2025-01-14 ENCOUNTER — HOSPITAL ENCOUNTER (OUTPATIENT)
Dept: RADIOLOGY | Facility: CLINIC | Age: 50
Discharge: HOME | End: 2025-01-14
Payer: COMMERCIAL

## 2025-01-14 VITALS — HEIGHT: 64 IN | BODY MASS INDEX: 25.78 KG/M2 | WEIGHT: 151 LBS

## 2025-01-14 DIAGNOSIS — Z12.31 SCREENING MAMMOGRAM FOR BREAST CANCER: ICD-10-CM

## 2025-01-14 PROCEDURE — 77063 BREAST TOMOSYNTHESIS BI: CPT | Performed by: RADIOLOGY

## 2025-01-14 PROCEDURE — 77067 SCR MAMMO BI INCL CAD: CPT | Performed by: RADIOLOGY

## 2025-01-14 PROCEDURE — 77067 SCR MAMMO BI INCL CAD: CPT

## 2025-01-15 ENCOUNTER — APPOINTMENT (OUTPATIENT)
Dept: OBSTETRICS AND GYNECOLOGY | Facility: CLINIC | Age: 50
End: 2025-01-15
Payer: COMMERCIAL

## 2025-01-15 VITALS
DIASTOLIC BLOOD PRESSURE: 74 MMHG | BODY MASS INDEX: 26.15 KG/M2 | HEIGHT: 64 IN | WEIGHT: 153.2 LBS | SYSTOLIC BLOOD PRESSURE: 126 MMHG

## 2025-01-15 DIAGNOSIS — Z78.0 MENOPAUSE: ICD-10-CM

## 2025-01-15 DIAGNOSIS — Z01.419 ENCOUNTER FOR ANNUAL ROUTINE GYNECOLOGICAL EXAMINATION: ICD-10-CM

## 2025-01-15 PROCEDURE — 99396 PREV VISIT EST AGE 40-64: CPT

## 2025-01-15 PROCEDURE — 3008F BODY MASS INDEX DOCD: CPT

## 2025-01-15 PROCEDURE — 1036F TOBACCO NON-USER: CPT

## 2025-01-15 RX ORDER — DROSPIRENONE 4 MG/1
1 TABLET, FILM COATED ORAL NIGHTLY
Qty: 90 TABLET | Refills: 3 | Status: SHIPPED | OUTPATIENT
Start: 2025-01-15

## 2025-01-15 ASSESSMENT — ENCOUNTER SYMPTOMS
PSYCHIATRIC NEGATIVE: 0
DEPRESSION: 0
OCCASIONAL FEELINGS OF UNSTEADINESS: 0
CARDIOVASCULAR NEGATIVE: 0
ALLERGIC/IMMUNOLOGIC NEGATIVE: 0
HEMATOLOGIC/LYMPHATIC NEGATIVE: 0
MUSCULOSKELETAL NEGATIVE: 0
NEUROLOGICAL NEGATIVE: 0
CONSTITUTIONAL NEGATIVE: 0
ENDOCRINE NEGATIVE: 0
LOSS OF SENSATION IN FEET: 0
GASTROINTESTINAL NEGATIVE: 0
EYES NEGATIVE: 0
RESPIRATORY NEGATIVE: 0

## 2025-01-15 ASSESSMENT — PAIN SCALES - GENERAL: PAINLEVEL_OUTOF10: 0-NO PAIN

## 2025-01-15 NOTE — PROGRESS NOTES
"Subjective   Kaila Jo is a 50 y.o. female who is here for Annual Exam (Last pap  wnl /Mammogram 25/Refill on slynd).     Menopausal -no periods for 2 years.   Experiencing menopause symptoms? VMS- not every day, patient reports very manageable.   The patient is taking hormone therapy.   Any Contraindications to HT:  No     Sexual Activity: sexually active, male partners; Patient reports 1 partners in the last 12 months.    History of prior STI: none  Desires STI screening? No    Current contraception: vasectomy and on slynd for perimenopause.     Last pap: - ASCUS neg hpv.   Last mammogram: yesterday 24.     History of abnormal Pap smear: yes -  ascus neg hpv due .   History of abnormal mammogram: yes - 11 years ago 2 markers on left side and one on the right side.     Family history of uterine or ovarian cancer: no  Family history of breast cancer: no      OB History    Para Term  AB Living   3 2 2 0 1 2   SAB IAB Ectopic Multiple Live Births   1 0 0 0 2      Objective   /74   Ht 1.626 m (5' 4\")   Wt 69.5 kg (153 lb 3.2 oz)   LMP 2021      General:   Alert and oriented x 3   Heart:  Lungs: Regular rate, rhythm  Clear to auscultation bilaterally   Thyroid: Euthyroid, normal shape and size   Breast: Symmetrical, no skin changes/nipple discharge, redness, tenderness, no masses palpated bilaterally   Abdomen: Soft, non tender   Vulva: EGBUS normal   Vagina: Pink, normal discharge   Cervix: No CMT   Uterus: Normal shape, size   Adnexa: NT bilaterally     Assessment/Plan   Diagnoses and all orders for this visit:  Encounter for annual routine gynecological examination  -     Slynd 4 mg (28) tablet; Take 1 tablet by mouth once daily at bedtime.  Menopause  -     Slynd 4 mg (28) tablet; Take 1 tablet by mouth once daily at bedtime.    All patient questions answered.   Encouraged to reach out to our office with any questions or concerns.   Encouraged patient to " follow up annually and PRN    DOROTHY Carcamo-CNM

## 2025-02-13 ENCOUNTER — APPOINTMENT (OUTPATIENT)
Dept: PRIMARY CARE | Facility: CLINIC | Age: 50
End: 2025-02-13
Payer: COMMERCIAL

## 2025-02-13 VITALS
OXYGEN SATURATION: 100 % | BODY MASS INDEX: 25.61 KG/M2 | HEIGHT: 64 IN | DIASTOLIC BLOOD PRESSURE: 89 MMHG | WEIGHT: 150 LBS | SYSTOLIC BLOOD PRESSURE: 139 MMHG | HEART RATE: 76 BPM

## 2025-02-13 DIAGNOSIS — L98.9 SKIN LESION: ICD-10-CM

## 2025-02-13 DIAGNOSIS — E06.3 HYPOTHYROIDISM DUE TO HASHIMOTO'S THYROIDITIS: ICD-10-CM

## 2025-02-13 DIAGNOSIS — R06.09 DYSPNEA ON EXERTION: ICD-10-CM

## 2025-02-13 DIAGNOSIS — Z13.220 SCREENING CHOLESTEROL LEVEL: ICD-10-CM

## 2025-02-13 DIAGNOSIS — J45.20 MILD INTERMITTENT ASTHMA WITHOUT COMPLICATION (HHS-HCC): Primary | ICD-10-CM

## 2025-02-13 DIAGNOSIS — Z00.00 WELL ADULT EXAM: ICD-10-CM

## 2025-02-13 PROCEDURE — 99396 PREV VISIT EST AGE 40-64: CPT | Performed by: STUDENT IN AN ORGANIZED HEALTH CARE EDUCATION/TRAINING PROGRAM

## 2025-02-13 PROCEDURE — 1036F TOBACCO NON-USER: CPT | Performed by: STUDENT IN AN ORGANIZED HEALTH CARE EDUCATION/TRAINING PROGRAM

## 2025-02-13 PROCEDURE — 3008F BODY MASS INDEX DOCD: CPT | Performed by: STUDENT IN AN ORGANIZED HEALTH CARE EDUCATION/TRAINING PROGRAM

## 2025-02-13 RX ORDER — ALBUTEROL SULFATE 90 UG/1
2 INHALANT RESPIRATORY (INHALATION) EVERY 4 HOURS PRN
Qty: 18 G | Refills: 0 | Status: SHIPPED | OUTPATIENT
Start: 2025-02-13

## 2025-02-13 ASSESSMENT — COLUMBIA-SUICIDE SEVERITY RATING SCALE - C-SSRS
6. HAVE YOU EVER DONE ANYTHING, STARTED TO DO ANYTHING, OR PREPARED TO DO ANYTHING TO END YOUR LIFE?: NO
2. HAVE YOU ACTUALLY HAD ANY THOUGHTS OF KILLING YOURSELF?: NO
1. IN THE PAST MONTH, HAVE YOU WISHED YOU WERE DEAD OR WISHED YOU COULD GO TO SLEEP AND NOT WAKE UP?: NO

## 2025-02-13 ASSESSMENT — PATIENT HEALTH QUESTIONNAIRE - PHQ9
2. FEELING DOWN, DEPRESSED OR HOPELESS: NOT AT ALL
1. LITTLE INTEREST OR PLEASURE IN DOING THINGS: NOT AT ALL
SUM OF ALL RESPONSES TO PHQ9 QUESTIONS 1 AND 2: 0

## 2025-02-13 NOTE — PROGRESS NOTES
Subjective   Patient ID: Kaila Jo is a 50 y.o. female who presents for Annual Exam (Pt is here for annual exam, BW check, meds refill ).  HPI  Monique is here for annual exam and medication refill.     She is feeling well, no complaints today. She did notice a rough spot on her chest that has been there since last summer. Denies any pain, discomfort, itchiness, or discoloration around the area. Inquires about a referral to dermatology.     She is sleeping well at night. Staying active. Eating a balanced diet and drinking plenty of water throughout the day.    Review of Systems  12-point ROS was reviewed and is negative, unless otherwise noted in HPI    Objective   Vitals:    02/13/25 1112   BP: 139/89   Pulse: 76   SpO2: 100%      Physical Exam  GEN: alert, conversant, NAD  HEENT: PERRL, EOMI, MMM, tms pearly gray bilaterally   NECK: supple, no LAD appreciated  CHEST: CTAB  MSK: No reproducible ttp  CV: S1, S2, RRR, no murmurs appreciated  ABD: soft, NT, ND  EXT: no significant LE edema  SKIN: warm, dry    Assessment/Plan   #well adult  - Counseled continued efforts on healthy lifestyle modification including balanced diet, and continued exercise for >5 minutes  - counseled age appropriate vaccines and preventative measures    #right sided chest wall pain, resolved   - continue sleep bra PRN  - use ice/heat PRN, acetaminophen/ibuprofen PRN  - CACS score 0, per Cardiolgoy     #Hypothyroidism d/t hashimotos   - Continue Levothyroxine 25mcg daily  - TSH ordered    #intermittent palpitations  #post-menopausal syndrome  - improved with [facility]    Health Maintenance:  Vaccines: COVID - UTD, Flu (UTD), TDAP (2016)  Screening: Colonoscopy (due in 2027-29), Mammogram (1/2025), Pap test (12/2023, follows with gyn). Referral to dermatology for full body skin check  Labs: CBC, CMP, lipid panel, TSH    RTC in 12 months, or sooner PRN    [facility] MS-3    Trainee role: Medical Student    I saw and evaluated the  patient. I personally obtained the key and critical portions of the history and physical exam or was physically present for key and critical portions performed by the trainee. I reviewed the trainee's documentation and discussed the patient with the trainee. I agree with the trainee's medical decision making as documented on the trainee's notes.    Steven Hutton, DO

## 2025-02-14 DIAGNOSIS — E06.3 HYPOTHYROIDISM DUE TO HASHIMOTO'S THYROIDITIS: ICD-10-CM

## 2025-02-14 LAB
ALBUMIN SERPL-MCNC: 4.8 G/DL (ref 3.6–5.1)
ALP SERPL-CCNC: 81 U/L (ref 37–153)
ALT SERPL-CCNC: 19 U/L (ref 6–29)
ANION GAP SERPL CALCULATED.4IONS-SCNC: 11 MMOL/L (CALC) (ref 7–17)
AST SERPL-CCNC: 15 U/L (ref 10–35)
BILIRUB SERPL-MCNC: 0.6 MG/DL (ref 0.2–1.2)
BUN SERPL-MCNC: 8 MG/DL (ref 7–25)
CALCIUM SERPL-MCNC: 9.6 MG/DL (ref 8.6–10.4)
CHLORIDE SERPL-SCNC: 101 MMOL/L (ref 98–110)
CHOLEST SERPL-MCNC: 204 MG/DL
CHOLEST/HDLC SERPL: 3.5 (CALC)
CO2 SERPL-SCNC: 24 MMOL/L (ref 20–32)
CREAT SERPL-MCNC: 0.79 MG/DL (ref 0.5–1.03)
EGFRCR SERPLBLD CKD-EPI 2021: 91 ML/MIN/1.73M2
ERYTHROCYTE [DISTWIDTH] IN BLOOD BY AUTOMATED COUNT: 12.1 % (ref 11–15)
GLUCOSE SERPL-MCNC: 88 MG/DL (ref 65–139)
HCT VFR BLD AUTO: 42.3 % (ref 35–45)
HDLC SERPL-MCNC: 59 MG/DL
HGB BLD-MCNC: 14.1 G/DL (ref 11.7–15.5)
LDLC SERPL CALC-MCNC: 122 MG/DL (CALC)
MCH RBC QN AUTO: 30.4 PG (ref 27–33)
MCHC RBC AUTO-ENTMCNC: 33.3 G/DL (ref 32–36)
MCV RBC AUTO: 91.2 FL (ref 80–100)
NONHDLC SERPL-MCNC: 145 MG/DL (CALC)
PLATELET # BLD AUTO: 302 THOUSAND/UL (ref 140–400)
PMV BLD REES-ECKER: 10.9 FL (ref 7.5–12.5)
POTASSIUM SERPL-SCNC: 4.3 MMOL/L (ref 3.5–5.3)
PROT SERPL-MCNC: 7.2 G/DL (ref 6.1–8.1)
RBC # BLD AUTO: 4.64 MILLION/UL (ref 3.8–5.1)
SODIUM SERPL-SCNC: 136 MMOL/L (ref 135–146)
TRIGL SERPL-MCNC: 122 MG/DL
TSH SERPL-ACNC: 1.88 MIU/L
WBC # BLD AUTO: 7.8 THOUSAND/UL (ref 3.8–10.8)

## 2025-02-14 RX ORDER — LEVOTHYROXINE SODIUM 25 UG/1
25 TABLET ORAL DAILY
Qty: 90 TABLET | Refills: 3 | Status: SHIPPED | OUTPATIENT
Start: 2025-02-14

## 2025-03-12 ENCOUNTER — OFFICE VISIT (OUTPATIENT)
Dept: DERMATOLOGY | Facility: CLINIC | Age: 50
End: 2025-03-12
Payer: COMMERCIAL

## 2025-03-12 DIAGNOSIS — L82.1 SEBORRHEIC KERATOSIS: Primary | ICD-10-CM

## 2025-03-12 PROCEDURE — 99202 OFFICE O/P NEW SF 15 MIN: CPT | Performed by: DERMATOLOGY

## 2025-03-12 NOTE — PROGRESS NOTES
Subjective     Kaila Jo is a 50 y.o. female who presents for the following: Suspicious Skin Lesion (New - PATIENT REFERRAL:  Made by Steven Miller DO, who last saw this patient on 02/13/25, recommended that Dermatology see patient for skin lesion. Area(s) of concern:  Mid chest that has been present for 2 years, raised, and itchy at times).     Review of Systems:  No other skin or systemic complaints other than what is documented elsewhere in the note.    The following portions of the chart were reviewed this encounter and updated as appropriate:          Skin Cancer History  No skin cancer on file.      Specialty Problems    None       Objective   Well appearing patient in no apparent distress; mood and affect are within normal limits.    A focused skin examination was performed of the chest, declined FBSE. All findings within normal limits unless otherwise noted below.    Assessment/Plan   1. Seborrheic keratosis (2)  Chest - Medial (Center), Right Breast  Brown, tan waxy macules and stuck on appearing papules and plaques    The benign nature of these skin lesions reviewed, reassure provided and no further treatment needed at this time.   These lesions can be removed, if symptomatic (itching, bleeding, rubbing on clothing, painful), otherwise removal is considered cosmetic.     We reviewed the warning signs of non-melanoma skin cancer and ABCDEs of melanoma  Please follow up should you notice any new or changing pre-existing skin lesion.

## 2025-03-12 NOTE — LETTER
March 12, 2025     Steven Hutton DO  [address]  [facility], [address]  [facility] [id number]    Patient: Kaila Jo   YOB: 1975   Date of Visit: 3/12/2025       Dear Dr. Steven Hutton DO:    Thank you for referring Kaila Jo to me for evaluation. Below are my notes for this consultation.  If you have questions, please do not hesitate to call me. I look forward to following your patient along with you.       Sincerely,     Lissy Kline DO      CC: No Recipients  ______________________________________________________________________________________    Subjective    Kaila Jo is a 50 y.o. female who presents for the following: Suspicious Skin Lesion (New - PATIENT REFERRAL:  Made by Steven Miller DO, who last saw this patient on 02/13/25, recommended that Dermatology see patient for skin lesion. Area(s) of concern:  Mid chest that has been present for 2 years, raised, and itchy at times).     Review of Systems:  No other skin or systemic complaints other than what is documented elsewhere in the note.    The following portions of the chart were reviewed this encounter and updated as appropriate:          Skin Cancer History  No skin cancer on file.      Specialty Problems    None       Objective  Well appearing patient in no apparent distress; mood and affect are within normal limits.    A focused skin examination was performed of the chest, declined FBSE. All findings within normal limits unless otherwise noted below.    Assessment/Plan  1. Seborrheic keratosis (2)  Chest - Medial (Center), Right Breast  Brown, tan waxy macules and stuck on appearing papules and plaques    The benign nature of these skin lesions reviewed, reassure provided and no further treatment needed at this time.   These lesions can be removed, if symptomatic (itching, bleeding, rubbing on clothing, painful), otherwise removal is considered cosmetic.     We reviewed the warning  signs of non-melanoma skin cancer and ABCDEs of melanoma  Please follow up should you notice any new or changing pre-existing skin lesion.

## 2025-07-03 ENCOUNTER — TELEPHONE (OUTPATIENT)
Dept: OBSTETRICS AND GYNECOLOGY | Facility: CLINIC | Age: 50
End: 2025-07-03
Payer: COMMERCIAL

## 2025-07-11 ENCOUNTER — DOCUMENTATION (OUTPATIENT)
Dept: OBSTETRICS AND GYNECOLOGY | Facility: CLINIC | Age: 50
End: 2025-07-11
Payer: COMMERCIAL